# Patient Record
Sex: MALE | Race: WHITE | NOT HISPANIC OR LATINO
[De-identification: names, ages, dates, MRNs, and addresses within clinical notes are randomized per-mention and may not be internally consistent; named-entity substitution may affect disease eponyms.]

---
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
## 2022-12-12 NOTE — ED PEDIATRIC NURSE NOTE - OBJECTIVE STATEMENT
Chronic calculous cholecystitis. IOC demonstrating gallstone at ampulla.
witnessed syncopal episode at friends house. pt did not hit his head. as per family pt was out for approx 10 seconds